# Patient Record
Sex: MALE | Race: BLACK OR AFRICAN AMERICAN | NOT HISPANIC OR LATINO | Employment: UNEMPLOYED | URBAN - METROPOLITAN AREA
[De-identification: names, ages, dates, MRNs, and addresses within clinical notes are randomized per-mention and may not be internally consistent; named-entity substitution may affect disease eponyms.]

---

## 2017-05-01 ENCOUNTER — TRANSCRIBE ORDERS (OUTPATIENT)
Dept: ADMINISTRATIVE | Facility: HOSPITAL | Age: 37
End: 2017-05-01

## 2017-05-01 DIAGNOSIS — L72.9 SKIN CYST: Primary | ICD-10-CM

## 2017-05-08 ENCOUNTER — HOSPITAL ENCOUNTER (OUTPATIENT)
Dept: INFUSION THERAPY | Facility: HOSPITAL | Age: 37
Discharge: HOME | End: 2017-05-08
Attending: PLASTIC SURGERY | Admitting: PLASTIC SURGERY

## 2017-05-08 VITALS
TEMPERATURE: 98.3 F | OXYGEN SATURATION: 97 % | DIASTOLIC BLOOD PRESSURE: 81 MMHG | SYSTOLIC BLOOD PRESSURE: 126 MMHG | HEART RATE: 80 BPM | RESPIRATION RATE: 20 BRPM

## 2017-05-08 DIAGNOSIS — L72.9 SKIN CYST: Primary | ICD-10-CM

## 2017-05-08 PROCEDURE — 88305 TISSUE EXAM BY PATHOLOGIST: CPT | Performed by: PLASTIC SURGERY

## 2017-05-08 RX ORDER — OXCARBAZEPINE 300 MG/1
300 TABLET, FILM COATED ORAL DAILY
COMMUNITY

## 2017-05-08 RX ORDER — LIDOCAINE HYDROCHLORIDE AND EPINEPHRINE 10; 10 MG/ML; UG/ML
20 INJECTION, SOLUTION INFILTRATION; PERINEURAL ONCE
Status: COMPLETED | OUTPATIENT
Start: 2017-05-08 | End: 2017-05-08

## 2017-05-08 RX ADMIN — LIDOCAINE HYDROCHLORIDE,EPINEPHRINE BITARTRATE 1 ML: 10; .01 INJECTION, SOLUTION INFILTRATION; PERINEURAL at 13:13

## 2017-05-09 LAB
CYTO UR: NORMAL
LAB AP CASE REPORT: NORMAL
LAB AP CLINICAL INFORMATION: NORMAL
Lab: NORMAL
PATH REPORT.FINAL DX SPEC: NORMAL
PATH REPORT.GROSS SPEC: NORMAL

## 2021-03-01 ENCOUNTER — HOSPITAL ENCOUNTER (OUTPATIENT)
Dept: GENERAL RADIOLOGY | Facility: HOSPITAL | Age: 41
Discharge: HOME | End: 2021-03-01
Admitting: NURSE PRACTITIONER

## 2021-03-01 ENCOUNTER — OFFICE VISIT (OUTPATIENT)
Dept: NEUROSURGERY | Facility: CLINIC | Age: 41
End: 2021-03-01

## 2021-03-01 VITALS
SYSTOLIC BLOOD PRESSURE: 116 MMHG | TEMPERATURE: 99.3 F | WEIGHT: 200 LBS | BODY MASS INDEX: 27.09 KG/M2 | DIASTOLIC BLOOD PRESSURE: 70 MMHG | HEIGHT: 72 IN

## 2021-03-01 DIAGNOSIS — M54.2 NECK PAIN: ICD-10-CM

## 2021-03-01 DIAGNOSIS — M43.6 NECK STIFFNESS: Primary | ICD-10-CM

## 2021-03-01 DIAGNOSIS — M43.6 NECK STIFFNESS: ICD-10-CM

## 2021-03-01 DIAGNOSIS — M79.601 PAIN OF RIGHT UPPER EXTREMITY: ICD-10-CM

## 2021-03-01 PROCEDURE — 72050 X-RAY EXAM NECK SPINE 4/5VWS: CPT

## 2021-03-01 PROCEDURE — 99203 OFFICE O/P NEW LOW 30 MIN: CPT | Performed by: NURSE PRACTITIONER

## 2021-03-01 NOTE — PROGRESS NOTES
Subjective   History of Present Illness: Robbie Simon is a 40 y.o. male is here today for follow-up via telephone visit.     You have chosen to receive care through a telephone visit. Do you consent to use a telephone visit for your medical care today? Yes    Patient was last seen in the office on 3/1/21 with YUSEF Clifford for extremity weakness. Patient had XRAY on 3/1/21 r/t right arm weakness from elbow to wrist on the right, with only neck stiffness, denies any neck pain.      Today, the patient reports he continues to have R wrist pain and arm weakness and states that it is the same that he deals with everyday.  He has not started PT yet, r/t he did not want to have to travel an hour and a half to get PT, he was under the impression that he had to have PT at a [facility] facility and he lives too far.   takes Aleve prn for pain, nightly.  He continues to deny any n/t in his arms and states that he starts out strong, but gets weaker at the end of the day.  He does state that PT worked for him, in the past.       During the telephone visit I was at the office and he was at his house and the telephone visit lasted about 6 minutes.      The following portions of the patient's history were reviewed and updated as appropriate: allergies, current medications, past family history, past medical history, past social history, past surgical history and problem list.    Review of Systems   Constitutional: Negative for fatigue.   Eyes: Negative for visual disturbance.   Genitourinary: Negative for difficulty urinating, frequency, testicular pain and urgency.   Musculoskeletal: Positive for neck pain and neck stiffness.   Neurological: Positive for weakness (R arm  ). Negative for dizziness, speech difficulty, light-headedness, numbness and headaches.   All other systems reviewed and are negative.      Objective     There were no vitals filed for this visit.  There is no height or weight on file to calculate  BMI.      Physical Exam  Vitals reviewed: deferred r/t telephone visit.   Neurological:      Mental Status: He is oriented to person, place, and time.       Neurologic Exam     Mental Status   Oriented to person, place, and time.   Deferred r/t telephone visit       Assessment/Plan   Independent Review of Radiographic Studies:      I personally reviewed the images from the cervical study that reveals some minimal disc space narrowing and spurring at C5-6 and C6-7. No subluxation occurs during flexion and extension.    Medical Decision Making:    Discussed going to PT, explained that he is able to go to a facility close to him.  PT order being mailed to him.  Did not want to make a f/up appointment for after PT, possibly r/t him living an hour and a half away, he will call if he is not better after PT.  He has been instructed to call for any worsening pain, new weakness, or any changes.  Will f/up PRN.      Diagnoses and all orders for this visit:    1. Neck stiffness (Primary)    2. Pain of right upper extremity    3. Weakness      No follow-ups on file.

## 2021-03-01 NOTE — PROGRESS NOTES
Subjective   Patient ID: Robbie Simon is a 40 y.o. male is being seen for consultation today at the request of Devora Romero MD for R wrist pain and arm weakness.     He states he has carpal tunnel and cubital surgery on wrist with short relief. He does have occasional neck pain and weakness in R arm. He also tried PT with did give some relief. Mr. Simon takes Tylenol 50 mg or Aleve 220 mg prn for pain.  Patient complains of pain mainly from his elbow down to his wrist on his right side.  He also complains of some neck pain pain but more of a tightness across the lower part of his cervical spine into his shoulders.  He further states that he feels that his arm is weak from his elbow to his wrist on the right and feels like he is constantly losing  on items.  Patient underwent carpal tunnel release and cubital release in 2018.  Patient denies any bowel/bladder dysfunction gait dysfunction, leg weakness, acute numbness/tingling or recent fall.    Neck Pain   This is a chronic problem. The current episode started more than 1 year ago. The problem occurs intermittently. The pain is associated with nothing. The pain is present in the midline, left side and right side. The quality of the pain is described as aching. The pain is mild. Nothing aggravates the symptoms. Stiffness is present all day. Associated symptoms include weakness (R arm ). Pertinent negatives include no headaches, leg pain, numbness or tingling. He has tried nothing for the symptoms.   Arm Pain   The incident occurred more than 1 week ago. There was no injury mechanism. The pain is present in the right elbow and right wrist. The quality of the pain is described as aching. The pain is mild. The pain has been fluctuating since the incident. Associated symptoms include muscle weakness. Pertinent negatives include no numbness or tingling. The symptoms are aggravated by lifting. He has tried acetaminophen and NSAIDs for the symptoms. The treatment  "provided no relief.       The following portions of the patient's history were reviewed and updated as appropriate: allergies, current medications, past family history, past medical history, past social history, past surgical history and problem list.    Review of Systems   Constitutional: Negative for fatigue.   Eyes: Negative for visual disturbance.   Genitourinary: Negative for difficulty urinating, frequency, testicular pain and urgency.   Musculoskeletal: Positive for back pain (lower back pain ), gait problem (low back pain ), neck pain and neck stiffness.   Neurological: Positive for weakness (R arm ). Negative for dizziness, tingling, speech difficulty, light-headedness, numbness and headaches.   All other systems reviewed and are negative.          Objective     Vitals:    03/01/21 1504   BP: 116/70   Temp: 99.3 °F (37.4 °C)   Weight: 90.7 kg (200 lb)   Height: 182.9 cm (72\")     Body mass index is 27.12 kg/m².      Physical Exam  Vitals signs reviewed.   Eyes:      Pupils: Pupils are equal, round, and reactive to light.   Pulmonary:      Effort: Pulmonary effort is normal.   Neurological:      Mental Status: He is oriented to person, place, and time.      Coordination: Finger-Nose-Finger Test and Romberg Test normal.      Gait: Gait is intact.      Deep Tendon Reflexes: Strength normal.      Reflex Scores:       Brachioradialis reflexes are 3+ on the right side.  Psychiatric:         Speech: Speech normal.       Neurologic Exam     Mental Status   Oriented to person, place, and time.   Speech: speech is normal   Level of consciousness: alert    Cranial Nerves     CN III, IV, VI   Pupils are equal, round, and reactive to light.    CN XI   CN XI normal.     Motor Exam   Muscle bulk: normal  Overall muscle tone: normal    Strength   Strength 5/5 throughout.     Sensory Exam   Light touch normal.   Pinprick normal.     Gait, Coordination, and Reflexes     Gait  Gait: normal    Coordination   Romberg: " negative  Finger to nose coordination: normal    Reflexes   Reflexes 2+ except as noted.   Right brachioradialis: 3+  Right plantar: normal  Left plantar: normal  Right Wayne: absent  Left Wayne: absent  Right ankle clonus: absent  Left ankle clonus: absent    Negative Lhermitte's  Negative Spurling's  Normal ROM cervical  Bilateral trapezius tightness noted    Assessment/Plan   Independent Review of Radiographic Studies:      I personally reviewed the images from the following studies.    No imaging    I did review outside EMG report from 2018 that was suggestive of carpal tunnel entrapment and cubital entrapment of right arm.    Medical Decision Making:    Patient presents as a referral from his hand surgeon for possible cervical radiculopathy.  Patient's exam was negative for any weakness. he does have some occasional tightness along his bilateral trapezius along with neck stiffness and occasional neck pain.  This is been going on for 2 years but fortunately has progressed minimally.  Recommendations for physical therapy as well as cervical flexion-extension films to review for degenerative changes.  We will follow patient back up with a televisit in 6 weeks for review of imaging for further assessment/evaluation and treatment plans.     Diagnoses and all orders for this visit:    1. Neck stiffness (Primary)    2. Neck pain    3. Pain of right upper extremity      Return in about 6 weeks (around 4/12/2021).        This patient was examined wearing appropriate personal protective equipment.     Patient is a former tobacco user. Recommendations for cessation/avoidance of tobacco products.    Smoking increases the risk of heart disease, lung disease, vascular disease, stroke, and cancer. If you smoke, STOP!    Patient's blood pressure reviewed today.  Recommendations for  a low-salt diet and exercise to maintain/improve BP in addition to taking any prescribed medications.    Patient's BMI is above goal.   Recommendations for a low-fat diet and exercise to improve/maintain BMI,  overall health and wellbeing.        Tami Hurd, YUSEF  03/01/21  15:42 EST

## 2021-03-18 ENCOUNTER — OFFICE VISIT (OUTPATIENT)
Dept: NEUROSURGERY | Facility: CLINIC | Age: 41
End: 2021-03-18

## 2021-03-18 ENCOUNTER — TELEPHONE (OUTPATIENT)
Dept: NEUROSURGERY | Facility: CLINIC | Age: 41
End: 2021-03-18

## 2021-03-18 DIAGNOSIS — M79.601 PAIN OF RIGHT UPPER EXTREMITY: ICD-10-CM

## 2021-03-18 DIAGNOSIS — R53.1 WEAKNESS: ICD-10-CM

## 2021-03-18 DIAGNOSIS — M43.6 NECK STIFFNESS: Primary | ICD-10-CM

## 2021-03-18 PROCEDURE — 99441 PR PHYS/QHP TELEPHONE EVALUATION 5-10 MIN: CPT | Performed by: NURSE PRACTITIONER

## 2021-03-18 NOTE — TELEPHONE ENCOUNTER
Called pt for tele visit scheduled for today. No answer left vm. Depending on when pt calls back appointment may need to be rescheduled to another day.     [facility] schedule is not open for April.